# Patient Record
Sex: MALE | Race: WHITE
[De-identification: names, ages, dates, MRNs, and addresses within clinical notes are randomized per-mention and may not be internally consistent; named-entity substitution may affect disease eponyms.]

---

## 2018-08-27 LAB — INR PPP: 1.03

## 2018-08-27 NOTE — LEVENE H&P
DATE OF ADMISSION: August 28, 2018



IDENTIFICATION/CHIEF COMPLAINT

The patient is a 58-year-old gentleman with a chief complaint of right knee 
pain.  



HISTORY OF PRESENT ILLNESS

The patient has a history of progressive right knee arthritis that has been 
painful and debilitating, refractory to conservative measures. Surgery is 
indicated to relieve symptoms after failure of nonoperative measures. 



PAST MEDICAL HISTORY

1.  Notable for hypertension. 

2.  Sleep apnea. 

3.  Restless leg syndrome. 

4.  Gout. 



PAST SURGICAL HISTORY

Notable for contralateral knee replacement. 



ALLERGIES

No known drug allergies. 



CURRENT MEDICATIONS

1.  Ropinirole 2 mg q nightly. 

2.  Lisinopril/Hydrochlorothiazide 20/12.5 mg 2 tablets q day. 

3.  Allopurinol 300 mg q day. 

4.  Baby Aspirin per day. 

5.  Vitamins.   



FAMILY HISTORY 

Non-contributory.



SOCIAL HISTORY

Notable for chewing a can of tobacco about every 3 days for 40 years.  He drinks
alcohol 2-3 beers per day.  Denies abuse.  



REVIEW OF SYSTEMS

Noncontributory. 



PHYSICAL EXAMINATION

GENERAL:  This is a well-developed, well-nourished male who appears stated age. 


HEENT:  Normocephalic, atraumatic.  Extraocular muscles intact.

NECK:  Supple, non-tender.

LUNGS:  Clear to auscultation bilaterally.

HEART:  Regular rate and rhythm.

ABDOMEN:  Soft.

ORTHOPEDIC EXAMINATION

Exam of the right knee he had mild varus deformity.  Effusions are present.  
Skin condition is good.  He is stiff with range of motion.  Gross stability is 
good.  Extensor function intact.  



RADIOGRAPHS 

Demonstrate end-stage degenerative changes.  



ASSESSMENT

Right knee degenerative joint disease progressively painful and debilitating, 
refractory to conservative care. 



PLAN

Per patient request, we are going to proceed with total knee arthroplasty.  The 
nature of the procedure, the risks, benefits, the anticipated rehabilitative 
course were reviewed.  Risks include but are not limited to death, major medical
or anesthetic complication, infection, neurovascular injury, stiffness, 
scarring, fracture, tendon rupture, instability, implant loosening, migration or
failure, blood transfusion, re-tear, progressive arthritis, persistent or 
recurrent pain or symptoms, need for additional surgery and other unforeseen.  
He understands and wishes to proceed.  A signed permit is placed in the chart.  
No guarantees are given or implied.  

BEV

## 2018-08-28 ENCOUNTER — HOSPITAL ENCOUNTER (INPATIENT)
Dept: HOSPITAL 89 - OR | Age: 58
LOS: 2 days | Discharge: HOME | DRG: 470 | End: 2018-08-30
Attending: ORTHOPAEDIC SURGERY | Admitting: ORTHOPAEDIC SURGERY
Payer: COMMERCIAL

## 2018-08-28 VITALS — DIASTOLIC BLOOD PRESSURE: 84 MMHG | SYSTOLIC BLOOD PRESSURE: 124 MMHG

## 2018-08-28 VITALS
WEIGHT: 315 LBS | WEIGHT: 315 LBS | HEIGHT: 73 IN | HEIGHT: 73 IN | BODY MASS INDEX: 41.75 KG/M2 | BODY MASS INDEX: 41.75 KG/M2

## 2018-08-28 VITALS — SYSTOLIC BLOOD PRESSURE: 118 MMHG | DIASTOLIC BLOOD PRESSURE: 63 MMHG

## 2018-08-28 VITALS — DIASTOLIC BLOOD PRESSURE: 69 MMHG | SYSTOLIC BLOOD PRESSURE: 121 MMHG

## 2018-08-28 VITALS — SYSTOLIC BLOOD PRESSURE: 110 MMHG | DIASTOLIC BLOOD PRESSURE: 71 MMHG

## 2018-08-28 VITALS — DIASTOLIC BLOOD PRESSURE: 65 MMHG | SYSTOLIC BLOOD PRESSURE: 123 MMHG

## 2018-08-28 VITALS — SYSTOLIC BLOOD PRESSURE: 113 MMHG | DIASTOLIC BLOOD PRESSURE: 76 MMHG

## 2018-08-28 VITALS — SYSTOLIC BLOOD PRESSURE: 116 MMHG | DIASTOLIC BLOOD PRESSURE: 67 MMHG

## 2018-08-28 VITALS — DIASTOLIC BLOOD PRESSURE: 71 MMHG | SYSTOLIC BLOOD PRESSURE: 116 MMHG

## 2018-08-28 VITALS — SYSTOLIC BLOOD PRESSURE: 121 MMHG | DIASTOLIC BLOOD PRESSURE: 71 MMHG

## 2018-08-28 VITALS — DIASTOLIC BLOOD PRESSURE: 67 MMHG | SYSTOLIC BLOOD PRESSURE: 116 MMHG

## 2018-08-28 VITALS — SYSTOLIC BLOOD PRESSURE: 110 MMHG | DIASTOLIC BLOOD PRESSURE: 81 MMHG

## 2018-08-28 VITALS — SYSTOLIC BLOOD PRESSURE: 118 MMHG | DIASTOLIC BLOOD PRESSURE: 84 MMHG

## 2018-08-28 DIAGNOSIS — R09.81: ICD-10-CM

## 2018-08-28 DIAGNOSIS — F17.220: ICD-10-CM

## 2018-08-28 DIAGNOSIS — M21.161: ICD-10-CM

## 2018-08-28 DIAGNOSIS — G25.81: ICD-10-CM

## 2018-08-28 DIAGNOSIS — E66.01: ICD-10-CM

## 2018-08-28 DIAGNOSIS — J45.909: ICD-10-CM

## 2018-08-28 DIAGNOSIS — M1A.9XX0: ICD-10-CM

## 2018-08-28 DIAGNOSIS — G47.33: ICD-10-CM

## 2018-08-28 DIAGNOSIS — I10: ICD-10-CM

## 2018-08-28 DIAGNOSIS — Z96.652: ICD-10-CM

## 2018-08-28 DIAGNOSIS — M17.11: Primary | ICD-10-CM

## 2018-08-28 PROCEDURE — 82435 ASSAY OF BLOOD CHLORIDE: CPT

## 2018-08-28 PROCEDURE — 36415 COLL VENOUS BLD VENIPUNCTURE: CPT

## 2018-08-28 PROCEDURE — 86900 BLOOD TYPING SEROLOGIC ABO: CPT

## 2018-08-28 PROCEDURE — 82947 ASSAY GLUCOSE BLOOD QUANT: CPT

## 2018-08-28 PROCEDURE — 84295 ASSAY OF SERUM SODIUM: CPT

## 2018-08-28 PROCEDURE — 82310 ASSAY OF CALCIUM: CPT

## 2018-08-28 PROCEDURE — 86901 BLOOD TYPING SEROLOGIC RH(D): CPT

## 2018-08-28 PROCEDURE — 86850 RBC ANTIBODY SCREEN: CPT

## 2018-08-28 PROCEDURE — 0SRC0J9 REPLACEMENT OF RIGHT KNEE JOINT WITH SYNTHETIC SUBSTITUTE, CEMENTED, OPEN APPROACH: ICD-10-PCS | Performed by: ORTHOPAEDIC SURGERY

## 2018-08-28 PROCEDURE — 85610 PROTHROMBIN TIME: CPT

## 2018-08-28 PROCEDURE — 84520 ASSAY OF UREA NITROGEN: CPT

## 2018-08-28 PROCEDURE — 84132 ASSAY OF SERUM POTASSIUM: CPT

## 2018-08-28 PROCEDURE — 82565 ASSAY OF CREATININE: CPT

## 2018-08-28 PROCEDURE — 97161 PT EVAL LOW COMPLEX 20 MIN: CPT

## 2018-08-28 PROCEDURE — 5A09357 ASSISTANCE WITH RESPIRATORY VENTILATION, LESS THAN 24 CONSECUTIVE HOURS, CONTINUOUS POSITIVE AIRWAY PRESSURE: ICD-10-PCS | Performed by: NURSE PRACTITIONER

## 2018-08-28 PROCEDURE — 82374 ASSAY BLOOD CARBON DIOXIDE: CPT

## 2018-08-28 RX ADMIN — HYDROCODONE BITARTRATE AND ACETAMINOPHEN PRN EACH: 7.5; 325 TABLET ORAL at 15:17

## 2018-08-28 RX ADMIN — SODIUM CHLORIDE SCH MLS/HR: 900 IRRIGANT IRRIGATION at 17:10

## 2018-08-28 RX ADMIN — OXYMETAZOLINE HYDROCHLORIDE SCH ML: 5 SPRAY NASAL at 21:00

## 2018-08-28 RX ADMIN — OXYMETAZOLINE HYDROCHLORIDE SCH ML: 5 SPRAY NASAL at 15:16

## 2018-08-28 RX ADMIN — CELECOXIB SCH MG: 200 CAPSULE ORAL at 17:11

## 2018-08-28 RX ADMIN — DIAZEPAM PRN MG: 5 TABLET ORAL at 19:55

## 2018-08-28 NOTE — OPERATIVE REPORT 1
EVENT DATE:  August 28, 2018  

SURGEON: Thony Cruz MD 

ANESTHESIOLOGIST:  Slade Noble MD 

ANESTHESIA:  General plus spinal.

ASSISTANT:  DEMARCUS Bran 





PREOPERATIVE DIAGNOSIS  

Right knee degenerative joint disease.



POSTOPERATIVE DIAGNOSIS 

Right knee degenerative joint disease 



PROCEDURE PERFORMED 

Right total knee arthroplasty.



ESTIMATED BLOOD LOSS 

Minimal.



DRAINS

None.



SPECIMENS 

None.   



COMPLICATIONS 

None apparent.



TOURNIQUET TIME 

63 minutes



IMPLANTS USED 

[employer] Triathlon knee system with 7 right PS femur, 7 standard tibial 
baseplate, 39 mm universal symmetric, all-polyethylene patellar button, and an 
11 mm PS tibial tray liner.  Polyethylene is X3.  



INDICATIONS

Wallace is a 58-year-old gentleman with intractable pain and disability related to 
end-stage knee arthritis.  Surgery is indicated to relieve symptoms after 
failure of nonoperative measures.  



DESCRIPTION OF PROCEDURE

Patient was taken to the operating room and placed supine on the operating 
table.  A spinal block was administered by the anesthesiologist.  General 
anesthesia was induced.  Antibiotics and TXA were administered IV.  Right lower 
extremity was prepped and draped in the usual sterile fashion for orthopedic 
surgery.  Limb was exsanguinated with an Esmarch bandage.  Tourniquet inflated 
to 300 mmHg.  Midline longitudinal incision is made, carried down through the 
skin and subcutaneous tissue to the extensor mechanism.  Full-thickness flaps 
are developed far enough medially to allow medial parapatellar arthrotomy be 
performed.  Due to the patient's size and body habitus, patella needs to be 
subluxed rather than everted.  A gentle subperiosteal medial release is 
initiated in a titrated fashion to balance the knee.  The menisci and the 
cruciate ligaments are removed.  A step drill is used to enter the distal femur.
 A 10-inch long alignment guide is used to engage the isthmus, cut set for 5 
degrees of valgus relative to the anatomic axis.  The 10 mm resection block is 
applied, pinned, and cuts made with an oscillating saw.  AP sizing guide is 
applied to the distal femoral cut, positioned for 3 degrees of external rotation
relative to the posterior condyles.  Size 7 is optimal without risk of notching.
 The four-in-one cutting block is applied.  Anterior, posterior, posterior 
chamfer, and anterior chamfer cuts are made respectively.  PS block is applied 
and centered.  Medial and lateral bone is removed through the box.  Trial femur 
has nice fit.    Attention is turned to tibial preparation.  The extramedullary 
guide is applied, positioned for varus, valgus, posterior slope, and rotation.  
It is set to resect 9 mm from the relatively intact lateral tibial plateau.  It 
is dropped down a couple millimeters to ensure an adequate cut.  Block is 
pinned.  Extramedullary alignment check is made and cuts made with an 
oscillating saw.  After removal of spurs and posterior condylar bone, gaps are 
balanced and symmetric with no additional releases required.  The size 7 
baseplate provides good bony coverage of the tibial cut without soft tissue 
overhang.  This is selected along with a trial liner and trial femur.  These are
inserted.  The knee is brought to full extension.  Patella is taken from a 
starting thickness of 25 to a residual of 15 with a patellar clamp and 
oscillating saw.  The 39 provides optimum bony coverage without soft tissue 
overhang.  Lug holes are drilled.  Patella tracks nicely with the no-touch 
technique.  Final tibial preparation consists of assuring appropriate rotational
and translational positioning of the component.  Boss is reamed.  Fin is 
punched.  Surfaces are lavaged.  Components cemented in a single stage.  Once 
the cement is fully polymerized, tourniquet is deflated.  Hemostasis is assured.
 All loose debris is cleared from the joint.  The 11 fills up the gap ideally, 
allowing the knee to drop to full extension without hyperextension, providing 
optimal soft tissue tension and stability.  The tray is lavaged and dried.  The 
liner is locked into the baseplate.  Joint is reduced.  Arthrotomy is closed in 
flexion with #2 Ethibond, subcutaneous tissue with 3-0 Vicryl, skin with 
surgical staples.  Xeroform and 4 x 4's applied as a dry, sterile dressing and a
compression wrap.  The patient is awakened from anesthesia and taken to the 
recovery room in stable condition having tolerated the procedure well.  



PLAN

Plan is for standard TKA rehab protocol.  

[facility]

## 2018-08-28 NOTE — HOSPITALIST CONSULTATION
History of Present Illness


Requesting Physician


Dr. Cruz


Reason for Consult


Medical Management


Chief Complaint


s/p right total knee replacement


History of Present Illness


He was admitted s/p right total knee replacement. It is reported the surgery 

went well and without complication.





History


Problems:  


(1) HTN (hypertension)


Status:  Chronic


(2) Restless leg syndrome


Status:  Chronic


(3) Gout


Status:  Chronic


(4) [place] (obstructive sleep apnea)


Status:  Chronic


Home Meds


Reported Medications


Lisinopril (LISINOPRIL) 40 Mg Tablet, 40 MG PO QDAY, TAB


   8/28/18


Hydrochlorothiazide (HYDROCHLOROTHIAZIDE) 12.5 Mg Capsule, 2 TAB PO QDAY, 

CAPSULE


   8/28/18


Aspirin (ASPIRIN) 81 Mg Tab.chew, 81 MG PO QDAY, TAB.CHEW


   8/8/18


Ropinirole Hcl (ROPINIROLE HCL) 2 Mg Tablet, 2 MG PO DAILY PRN for LEG 

RESTLESSNESS


   3/25/16


Allopurinol (ZYLOPRIM) 300 Mg Tablet, 300 MG PO QDAY, TAB


   3/25/16


Discontinued Reported Medications


Lisinopril (LISINOPRIL) 40 Mg Tablet, 40 MG PO QDAY, TAB


   8/28/18


Lisinopril (LISINOPRIL) 10 Mg Tablet, 12.5 MG PO QDAY, #2 TAB


   8/8/18


Allergies:  


Coded Allergies:  


     No Known Drug Allergies (Unverified , 3/25/16)


Patient History:  


FH: breast cancer


  MOTHER


Hx Smoking:  No (3-4 CANS/WEEK)


Caffeine Intake:  Soda


Caffeine/Cups Per Day:  occassionally


Hx Alcohol Use:  Yes


Alcohol Used:  Beer


Hx Substance Use Disorder:  No


Social Drug Use:  Never


History of IV Drug Use:  No





Review of Systems


All Systems Reviewed/Normal:  Yes, Except as Noted


ENT:  Sinus Congestion





Exam


Vital Signs





Vital Signs








  Date Time  Temp Pulse Resp B/P (MAP) Pulse Ox O2 Delivery O2 Flow Rate FiO2


 


8/28/18 14:16 98.0 67 16 123/65 (84) 91 Nasal Cannula 2.0 








General Appearance:  Alert, Awake, No Acute Distress, Afebrile


Neuro:  No Gross deficits


Cardiovascular:  Regular Rate and Rhythm


Respiratory:  No Respiratory Distress, Clear to Auscultation


GI:  Abd Soft and Non-Tender


Extremities:  Warm, Perfused


Psych:  Alert & Oriented X3, Appropriate Mood & Affect





Assessment and Plan


Problems:  


(1) Status post total right knee replacement


Status:  Acute


Assessment & Plan:  Followed by Dr. Cruz. He will be placed on Aspirin for DVT

prophylaxis. He has no history of DVT or PE. 





(2) HTN (hypertension)


Status:  Chronic


Assessment & Plan:  He is on chronic treatment with Lisinopril and 

Hydrochlorothiazide. The lisinopril has been restarted with hold parameters. 





(3) Gout


Status:  Chronic


Assessment & Plan:  He is on chronic treatment with Allopurinol. 





(4) Restless leg syndrome


Status:  Chronic


Assessment & Plan:  He is on chronic treatment with Ropinirole. 





(5) [place] (obstructive sleep apnea)


Status:  Chronic


Assessment & Plan:  He brought his CPAP to use during admission. 





(6) Nasal congestion


Status:  Acute


Assessment & Plan:  We will try nasal spray Afrin to help with nasal congestion.







(7) Morbid obesity with BMI of 50.0-59.9, adult


Status:  Chronic





Venous Thromboembolism


Antithrombotics


Is Pt On Any Antithrombotics?:  No





Exam


Sepsis Risk:  No Definite Risk





Problem Qualifiers





(1) HTN (hypertension):  


Hypertension type:  essential hypertension  Qualified Codes:  I10 - Essential 

(primary) hypertension








NOEMI COATS [facility]            Aug 28, 2018 14:50

## 2018-08-28 NOTE — RADIOLOGY IMAGING REPORT
FACILITY: [facility] 

 

PATIENT NAME: Wallace Menjivar

: 1960

MR: [id number]

V: [id number]

EXAM DATE: 

ORDERING PHYSICIAN: MICHELLE ROBIN

TECHNOLOGIST: 

 

[place] [facility] [place]: Wallace Menjivar

: 1960

MRN: [id number]

Visit/Account:[id number]

Date of Sevice:  2018

 

ACCESSION #: [id number]

 

KNEE LIMITED RIGHT

 

COMPARISON: None

 

HISTORY:  RIGHT TKA

 

TECHNIQUE:  3 views of the right knee

 

FINDINGS:

BONES:  Hardware components of a right total knee arthroplasty are present in anatomic alignment with
out immediate complication. There is no fracture.

 

SOFT TISSUES:  Gas in the anterior soft tissues related to surgery. Skin staples anteriorly.

 

EFFUSION:  None suggested.

 

OTHER:  Negative.

 

IMPRESSION:

Right total knee arthroplasty in good alignment without immediate complication. Recent postsurgical c
hanges.

 

Report Dictated By: Rupert Rebolledo at 2018 1:08 PM

 

Report E-Signed By: Rupert Rebolledo  at 2018 1:11 PM

 

WSN:[id number]

## 2018-08-29 VITALS — SYSTOLIC BLOOD PRESSURE: 108 MMHG | DIASTOLIC BLOOD PRESSURE: 82 MMHG

## 2018-08-29 VITALS — DIASTOLIC BLOOD PRESSURE: 84 MMHG | SYSTOLIC BLOOD PRESSURE: 133 MMHG

## 2018-08-29 VITALS — DIASTOLIC BLOOD PRESSURE: 13 MMHG | SYSTOLIC BLOOD PRESSURE: 113 MMHG

## 2018-08-29 VITALS — DIASTOLIC BLOOD PRESSURE: 85 MMHG | SYSTOLIC BLOOD PRESSURE: 119 MMHG

## 2018-08-29 VITALS — DIASTOLIC BLOOD PRESSURE: 60 MMHG | SYSTOLIC BLOOD PRESSURE: 109 MMHG

## 2018-08-29 RX ADMIN — DIAZEPAM PRN MG: 5 TABLET ORAL at 04:10

## 2018-08-29 RX ADMIN — SODIUM CHLORIDE SCH MLS/HR: 900 IRRIGANT IRRIGATION at 00:28

## 2018-08-29 RX ADMIN — HYDROCODONE BITARTRATE AND ACETAMINOPHEN PRN EACH: 7.5; 325 TABLET ORAL at 15:08

## 2018-08-29 RX ADMIN — CELECOXIB SCH MG: 200 CAPSULE ORAL at 08:19

## 2018-08-29 RX ADMIN — HYDROCODONE BITARTRATE AND ACETAMINOPHEN PRN EACH: 7.5; 325 TABLET ORAL at 05:24

## 2018-08-29 RX ADMIN — HYDROCODONE BITARTRATE AND ACETAMINOPHEN PRN EACH: 7.5; 325 TABLET ORAL at 09:39

## 2018-08-29 RX ADMIN — ALLOPURINOL SCH MG: 300 TABLET ORAL at 08:19

## 2018-08-29 RX ADMIN — LISINOPRIL SCH MG: 20 TABLET ORAL at 09:00

## 2018-08-29 RX ADMIN — CELECOXIB SCH MG: 200 CAPSULE ORAL at 18:26

## 2018-08-29 RX ADMIN — HYDROCODONE BITARTRATE AND ACETAMINOPHEN PRN EACH: 7.5; 325 TABLET ORAL at 00:28

## 2018-08-29 RX ADMIN — SODIUM CHLORIDE SCH MLS/HR: 900 IRRIGANT IRRIGATION at 08:24

## 2018-08-29 RX ADMIN — OXYMETAZOLINE HYDROCHLORIDE SCH ML: 5 SPRAY NASAL at 21:00

## 2018-08-29 RX ADMIN — OXYMETAZOLINE HYDROCHLORIDE SCH ML: 5 SPRAY NASAL at 09:00

## 2018-08-29 RX ADMIN — ASPIRIN SCH MG: 325 TABLET, FILM COATED ORAL at 08:19

## 2018-08-29 RX ADMIN — DIAZEPAM PRN MG: 5 TABLET ORAL at 10:15

## 2018-08-29 NOTE — HOSPITALIST PROGRESS NOTE
Subjective


Progress Notes


Subjective


He has no complaints this morning. He had no acute events overnight.





Patient Complains of:


Cardiovascular:  No: Chest Pain


Respiratory:  No: Shortness of Breath





Physical Exam





Vital Signs








  Date Time  Temp Pulse Resp B/P (MAP) Pulse Ox O2 Delivery O2 Flow Rate FiO2


 


8/29/18 07:38     90 Nasal Cannula 2.0 


 


8/29/18 07:37 97.9 65 20 113/13 (46)    














Intake and Output 


 


 8/29/18





 06:59


 


Intake Total 5560 ml


 


Balance 5560 ml


 


 


 


Intake Oral 1360 ml


 


IV Total 2200 ml


 


Other 2000 ml


 


# Voids 2








General Appearance:  Alert, Awake, No Acute Distress, Afebrile


Neuro:  No Gross deficits


Cardiovascular:  Regular Rate and Rhythm


Respiratory:  No Respiratory Distress, Clear to Auscultation


GI:  Soft and Non-Tender


Psych:  Alert & Oriented X3, Appropriate Mood & Affect


Result Diagram:  


8/29/18 0523








Assessment and Plan


Problems:  


(1) Status post total right knee replacement


Status:  Acute


Assessment & Plan:  Followed by Dr. Cruz. He will be placed on Aspirin for DVT

prophylaxis. He has no history of DVT or PE. 





(2) HTN (hypertension)


Status:  Chronic


Assessment & Plan:  He is on chronic treatment with Lisinopril and 

Hydrochlorothiazide. The lisinopril has been restarted with hold parameters. 





(3) Gout


Status:  Chronic


Assessment & Plan:  He is on chronic treatment with Allopurinol. 





(4) Restless leg syndrome


Status:  Chronic


Assessment & Plan:  He is on chronic treatment with Ropinirole. 





(5) [place] (obstructive sleep apnea)


Status:  Chronic


Assessment & Plan:  He brought his CPAP to use during admission. 





(6) Nasal congestion


Status:  Acute


Assessment & Plan:  He has improvement in congestion symptoms with Afrin. 





(7) Morbid obesity with BMI of 50.0-59.9, adult


Status:  Chronic





Exam


Sepsis Risk:  No Definite Risk





Problem Qualifiers





(1) HTN (hypertension):  


Hypertension type:  essential hypertension  Qualified Codes:  I10 - Essential 

(primary) hypertension








NOEMI COATS P            Aug 29, 2018 11:15

## 2018-08-30 VITALS — SYSTOLIC BLOOD PRESSURE: 127 MMHG | DIASTOLIC BLOOD PRESSURE: 86 MMHG

## 2018-08-30 VITALS — DIASTOLIC BLOOD PRESSURE: 84 MMHG | SYSTOLIC BLOOD PRESSURE: 124 MMHG

## 2018-08-30 RX ADMIN — CELECOXIB SCH MG: 200 CAPSULE ORAL at 09:12

## 2018-08-30 RX ADMIN — ASPIRIN SCH MG: 325 TABLET, FILM COATED ORAL at 09:13

## 2018-08-30 RX ADMIN — OXYMETAZOLINE HYDROCHLORIDE SCH ML: 5 SPRAY NASAL at 09:00

## 2018-08-30 RX ADMIN — ALLOPURINOL SCH MG: 300 TABLET ORAL at 09:13

## 2018-08-30 RX ADMIN — HYDROCODONE BITARTRATE AND ACETAMINOPHEN PRN EACH: 7.5; 325 TABLET ORAL at 08:44

## 2018-08-30 RX ADMIN — HYDROCODONE BITARTRATE AND ACETAMINOPHEN PRN EACH: 7.5; 325 TABLET ORAL at 04:08

## 2018-08-30 RX ADMIN — LISINOPRIL SCH MG: 20 TABLET ORAL at 09:13

## 2018-08-30 NOTE — HOSPITALIST PROGRESS NOTE
Subjective


Progress Notes


Subjective


He has no complaints this morning. He wants to go home today. He had no acute 


events overnight.





Patient Complains of:


Cardiovascular:  No: Chest Pain


Respiratory:  No: Shortness of Breath





Physical Exam





Vital Signs








  Date Time  Temp Pulse Resp B/P (MAP) Pulse Ox O2 Delivery O2 Flow Rate FiO2


 


8/30/18 09:10 98.5 100 16 127/86 (100) 89 Room Air  


 


8/30/18 03:36       4.0 














Intake and Output 


 


 8/30/18





 06:59


 


Intake Total 1110 ml


 


Balance 1110 ml


 


 


 


Intake Oral 1000 ml


 


IV Total 110 ml


 


# Voids 3








General Appearance:  Alert, Awake, No Acute Distress, Afebrile


Neuro:  No Gross deficits


Cardiovascular:  Regular Rate and Rhythm


Respiratory:  No Respiratory Distress, Clear to Auscultation


Psych:  Alert & Oriented X3, Appropriate Mood & Affect


Result Diagram:  


8/29/18 0523








Assessment and Plan


Problems:  


(1) Status post total right knee replacement


Status:  Acute


Assessment & Plan:  Followed by Dr. Cruz. He will be placed on Aspirin for DVT


prophylaxis. He has no history of DVT or PE. 





(2) HTN (hypertension)


Status:  Chronic


Assessment & Plan:  He is on chronic treatment with Lisinopril and Hydrochl


orothiazide. 





(3) Gout


Status:  Chronic


Assessment & Plan:  He is on chronic treatment with Allopurinol. 





(4) Restless leg syndrome


Status:  Chronic


Assessment & Plan:  He is on chronic treatment with Ropinirole. 





(5) [place] (obstructive sleep apnea)


Status:  Chronic


Assessment & Plan:  He brought his CPAP to use during admission. 





(6) Nasal congestion


Status:  Acute


Assessment & Plan:  Resolved. He had improvement in congestion symptoms with 


Afrin. 





(7) Morbid obesity with BMI of 50.0-59.9, adult


Status:  Chronic





Exam


Sepsis Risk:  No Definite Risk





Problem Qualifiers





(1) HTN (hypertension):  


Hypertension type:  essential hypertension  Qualified Codes:  I10 - Essential 


(primary) hypertension








NOEMI COATS [facility]            Aug 30, 2018 12:56